# Patient Record
Sex: MALE | Race: WHITE | NOT HISPANIC OR LATINO
[De-identification: names, ages, dates, MRNs, and addresses within clinical notes are randomized per-mention and may not be internally consistent; named-entity substitution may affect disease eponyms.]

---

## 2019-07-01 ENCOUNTER — RESULT REVIEW (OUTPATIENT)
Age: 69
End: 2019-07-01

## 2022-07-12 ENCOUNTER — NON-APPOINTMENT (OUTPATIENT)
Age: 72
End: 2022-07-12

## 2022-07-13 ENCOUNTER — EMERGENCY (EMERGENCY)
Facility: HOSPITAL | Age: 72
LOS: 1 days | Discharge: HOME | End: 2022-07-13
Attending: EMERGENCY MEDICINE | Admitting: EMERGENCY MEDICINE
Payer: COMMERCIAL

## 2022-07-13 VITALS
DIASTOLIC BLOOD PRESSURE: 79 MMHG | WEIGHT: 154.98 LBS | HEIGHT: 67 IN | TEMPERATURE: 98 F | OXYGEN SATURATION: 98 % | SYSTOLIC BLOOD PRESSURE: 188 MMHG | RESPIRATION RATE: 16 BRPM | HEART RATE: 78 BPM

## 2022-07-13 VITALS
RESPIRATION RATE: 19 BRPM | HEART RATE: 71 BPM | DIASTOLIC BLOOD PRESSURE: 71 MMHG | SYSTOLIC BLOOD PRESSURE: 164 MMHG | OXYGEN SATURATION: 98 %

## 2022-07-13 PROCEDURE — 99284 EMERGENCY DEPT VISIT MOD MDM: CPT | Mod: 25

## 2022-07-13 PROCEDURE — 99284 EMERGENCY DEPT VISIT MOD MDM: CPT

## 2022-07-13 PROCEDURE — 70450 CT HEAD/BRAIN W/O DYE: CPT | Mod: 26,MA

## 2022-07-13 PROCEDURE — 70450 CT HEAD/BRAIN W/O DYE: CPT | Mod: MA

## 2022-07-13 NOTE — ED PROVIDER NOTE - PATIENT PORTAL LINK FT
You can access the FollowMyHealth Patient Portal offered by [facility] by registering at the following website: [email or web address]. By joining [employer]’s FollowMyHealth portal, you will also be able to view your health information using other applications (apps) compatible with our system.

## 2022-07-13 NOTE — ED ADULT NURSE NOTE - OBJECTIVE STATEMENT
Pt stated " Urgent Care sent me here, I fell in my garage 4 days ago and hit my head on the car door, I have no pain "  MURPHY without difficulty, Gait steady, denies dizziness or vision changes.

## 2022-07-13 NOTE — ED PROVIDER NOTE - MUSCULOSKELETAL, MLM
Spine appears normal, range of motion is not limited, no muscle or joint tenderness, no C/T/L spine ttp or ecchymosis, BUE and BLE NT with FROM

## 2022-07-13 NOTE — ED PROVIDER NOTE - OBJECTIVE STATEMENT
72 y/o M with hx of hypertension, hyperlipidemia, diabetes on aspirin 81 mg presents with complaining of head injury x 4 days ago. Patient states that he was standing on a box in his garage trying to fix a light about 1.5 feet height from the ground, lost balance and fell backwards hitting his posterior head on 7/9/2022. Patient was seen in urgent care today and sent to ED for CT scan head. denies LOC, headache or dizziness nausea vomiting vision changes numbness tingling weakness neck pain back pain, open wounds, other injuries symptoms.

## 2022-07-13 NOTE — ED PROVIDER NOTE - CLINICAL SUMMARY MEDICAL DECISION MAKING FREE TEXT BOX
Nilda BECKER for ED attending, Dr. Ortiz: 72 y/o M w/ PMHx of presents to ED c/o head injury x 4 days ago. Pt fell in his garage while standing on a box changing a light bulb and hit his head on car door. Denies head pain or LOC. Pt was seen at urgent care and sent to ED for further evaluation. Physical exam: NAD, normocephalic atraumatic, PERRL b/l. CT was negative. Pt currently w/ no complaints just wanted to be evaluated for head trauma.

## 2022-07-13 NOTE — ED ADULT TRIAGE NOTE - CHIEF COMPLAINT QUOTE
" Urgent Care sent me here, I fell in my garage 4 days ago and hit my head on the car door, I have no pain "

## 2022-07-13 NOTE — ED PROVIDER NOTE - NS ED ATTENDING STATEMENT MOD
This was a shared visit with the ROYAL. I reviewed and verified the documentation and independently performed the documented:

## 2022-07-13 NOTE — ED PROVIDER NOTE - NSFOLLOWUPINSTRUCTIONS_ED_ALL_ED_FT
Head Injury, Adult       There are many types of head injuries. They can be as minor as a small bump. Some head injuries can be worse. Worse injuries include:  •A strong hit to the head that shakes the brain back and forth, causing damage (concussion).      •A bruise (contusion) of the brain. This means there is bleeding in the brain that can cause swelling.      •A cracked skull (skull fracture).      •Bleeding in the brain that gathers, gets thick (makes a clot), and forms a bump (hematoma).      Most problems from a head injury come in the first 24 hours. However, you may still have side effects up to 7–10 days after your injury. It is important to watch your condition for any changes. You may need to be watched in the emergency department or urgent care, or you may need to stay in the hospital.      What are the causes?    There are many possible causes of a head injury. A serious head injury may be caused by:  •A car accident.      •Bicycle or motorcycle accidents.      •Sports injuries.      •Falls.      •Being hit by an object.        What are the signs or symptoms?    Symptoms of a head injury include a bruise, bump, or bleeding where the injury happened. Other physical symptoms may include:  •Headache.      •Feeling like you may vomit (nauseous) or vomiting.      •Dizziness.      •Blurred or double vision.      •Being uncomfortable around bright lights or loud noises.      •Shaking movements that you cannot control (seizures).      •Feeling tired.      •Trouble being woken up.      •Fainting or loss of consciousness.      Mental or emotional symptoms may include:  •Feeling grumpy or cranky.      •Confusion and memory problems.      •Having trouble paying attention or concentrating.      •Changes in eating or sleeping habits.      •Feeling worried or nervous (anxious).      •Feeling sad (depressed).        How is this treated?    Treatment for this condition depends on how severe the injury is and the type of injury you have. The main goal is to prevent problems and to allow the brain time to heal.    Mild head injury     If you have a mild head injury, you may be sent home, and treatment may include:  •Being watched. A responsible adult should stay with you for 24 hours after your injury and check on you often.      •Physical rest.      •Brain rest.      •Pain medicines.      Severe head injury    If you have a severe head injury, treatment may include:  •Being watched closely. This includes staying in the hospital.    •Medicines to:  •Help with pain.      •Prevent seizures.      •Help with brain swelling.        •Protecting your airway and using a machine that helps you breathe (ventilator).      •Treatments to watch for and manage swelling inside the brain.    •Brain surgery. This may be needed to:  •Remove a collection of blood or blood clots.      •Stop the bleeding.      •Remove a part of the skull. This allows room for the brain to swell.          Follow these instructions at home:    Activity     •Rest.      •Avoid activities that are hard or tiring.      •Make sure you get enough sleep.    •Let your brain rest. Do this by limiting activities that need a lot of thought or attention, such as:  •Watching TV.      •Playing memory games and puzzles.      •Job-related work or homework.      •Working on the computer, social media, and texting.        •Avoid activities that could cause another head injury until your doctor says it is okay. This includes playing sports. Having another head injury, especially before the first one has healed, can be dangerous.      •Ask your doctor when it is safe for you to go back to your normal activities, such as work or school. Ask your doctor for a step-by-step plan for slowly going back to your normal activities.      •Ask your doctor when you can drive, ride a bicycle, or use heavy machinery. Do not do these activities if you are dizzy.        Lifestyle      • Do not drink alcohol until your doctor says it is okay.      • Do not use drugs.      •If it is harder than usual to remember things, write them down.      •If you are easily distracted, try to do one thing at a time.      •Talk with family members or close friends when making important decisions.      •Tell your friends, family, a trusted co-worker, and  about your injury, symptoms, and limits (restrictions). Have them watch for any problems that are new or getting worse.      General instructions     •Take over-the-counter and prescription medicines only as told by your doctor.      •Have someone stay with you for 24 hours after your head injury. This person should watch you for any changes in your symptoms and be ready to get help.      •Keep all follow-up visits as told by your doctor. This is important.      How is this prevented?     •Work on your balance and strength. This can help you avoid falls.      •Wear a seat belt when you are in a moving vehicle.    •Wear a helmet when you:  •Ride a bicycle.      •Ski.      •Do any other sport or activity that has a risk of injury.      •If you drink alcohol:•Limit how much you use to:  •0–1 drink a day for nonpregnant women.      •0–2 drinks a day for men.        •Be aware of how much alcohol is in your drink. In the [place] one drink equals one 12 oz bottle of beer (355 mL), one 5 oz glass of wine (148 mL), or one 1½ oz glass of hard liquor (44 mL).      •Make your home safer by:  •Getting rid of clutter from the floors and stairs. This includes things that can make you trip.      •Using grab bars in bathrooms and handrails by stairs.      •Placing non-slip mats on floors and in bathtubs.      •Putting more light in dim areas.          Where to find more information    •Centers for Disease Control and Prevention: www.cdc.gov        Get help right away if:  •You have:  •A very bad headache that is not helped by medicine.      •Trouble walking or weakness in your arms and legs.      •Clear or bloody fluid coming from your nose or ears.      •Changes in how you see (vision).      •A seizure.      •More confusion or more grumpy moods.        •Your symptoms get worse.      •You are sleepier than normal and have trouble staying awake.      •You lose your balance.      •The black centers of your eyes (pupils) change in size.      •Your speech is slurred.      •Your dizziness gets worse.      •You vomit.      These symptoms may be an emergency. Do not wait to see if the symptoms will go away. Get medical help right away. Call your local emergency services (911 in the [place] Do not drive yourself to the hospital.       Summary    •Head injuries can be as minor as a small bump. Some head injuries can be worse.      •Treatment for this condition depends on how severe the injury is and the type of injury you have.      •Have someone stay with you for 24 hours after your head injury.      •Ask your doctor when it is safe for you to go back to your normal activities, such as work or school.      •To prevent a head injury, wear a seat belt in a car, wear a helmet when you use a bicycle, limit your alcohol use, and make your home safer.

## 2022-07-13 NOTE — ED PROVIDER NOTE - NS_ ATTENDINGSCRIBEDETAILS _ED_A_ED_FT
Harrison Ortiz MD - The scribe's documentation has been prepared under my direction and personally reviewed by me in its entirety. I confirm that the note above accurately reflects all work, treatment, procedures, and medical decision making performed by me.